# Patient Record
(demographics unavailable — no encounter records)

---

## 2024-11-05 NOTE — REVIEW OF SYSTEMS
[Negative] : Genitourinary [FreeTextEntry2] :  anxious appearing but in no acute distress [FreeTextEntry9] : Sciatica and muscle aches and pains.

## 2024-11-05 NOTE — PHYSICAL EXAM
[Normal Conjunctiva] : normal conjunctiva [Normal Venous Pressure] : normal venous pressure [Normal S1, S2] : normal S1, S2 [No Murmur] : no murmur [Clear Lung Fields] : clear lung fields [Good Air Entry] : good air entry [Soft] : abdomen soft [Normal Gait] : normal gait [No Edema] : no edema [No Rash] : no rash [Moves all extremities] : moves all extremities [Alert and Oriented] : alert and oriented [Normal memory] : normal memory [de-identified] : Thin woman in no acute distress.

## 2024-11-05 NOTE — PHYSICAL EXAM
[Normal Conjunctiva] : normal conjunctiva [Normal Venous Pressure] : normal venous pressure [Normal S1, S2] : normal S1, S2 [No Murmur] : no murmur [Clear Lung Fields] : clear lung fields [Good Air Entry] : good air entry [Soft] : abdomen soft [Normal Gait] : normal gait [No Edema] : no edema [No Rash] : no rash [Moves all extremities] : moves all extremities [Alert and Oriented] : alert and oriented [Normal memory] : normal memory [de-identified] : Thin woman in no acute distress.

## 2024-11-05 NOTE — HISTORY OF PRESENT ILLNESS
[FreeTextEntry1] : Notes fatigue in the morning. She also reports feeling depressed particularly regarding living alone and some estrangement issues regarding her daughter and granddaughter. She is taking medications consistently and produces a blood pressure log that I reviewed which shows blood pressures ranging from the 1 teens to the 170s systolic. She is scheduled to go to [place] soon and is somewhat concerned about her early morning fatigue.  She does mention that she may be depressed.   Prior: new left leg radiculr pain. Head injury from an umbrella. Possible Concussion. Taken of plavix. Still taking baby aspirin...usually. subtle itching leg rash a few weeks ago.  Prior: She has been doing okay. BP at home is better. Some " normal low" BP readings. Feel like the medicine make her tired. Was given spinal cortisone injections and her thigh pain resolved. PT ongoing.  Prior: She was returned to the office today with her son and is concerned about 2 things 1 her blood pressure seems to be elevated since our last visit.  She also has been having bilateral upper groin pain that was treated by her pain medicine doctor with a right hip bursal injection.  There was improvement in her pain. She has been taking her valsartan and labetalol consistently. She does report considerably more stress in her life of late since her 's death and some friction with her son.  Prior: Dear Jaylan, Thank you for referring her for cardiovascular evaluation and management of her hypertension. She is an 84-year-old woman who was recently diagnosed with an occipital stroke and has been noted to have labile blood pressure in your office. Last week, after noticing an unstable gait she went to the hospital and was diagnosed with an occipital stroke she was started on aspirin and Plavix and her blood pressure continued to be difficult to measure.  In your office her blood pressure ranged from 167/82 to 124/70.  Her home blood pressure readings were variable as well. She has a history of hyperlipidemia and has been taking Repatha variably. Prior to Repatha she reports severe thigh muscle aches with statin use. She also has a history of anxiety and depression and been taking sertraline and BuSpar and duloxetine, though she has been alternating her dose of sertraline and BuSpar to every other day. Previously, her blood pressure has been difficult to control because of reactions to medications.  She reports some form of reaction to lisinopril including a rash on her stomach and leg.  She took amlodipine and may have had some troubles as well. Hydralazine caused her to become very lightheaded. With history of MGUS and is followed by Dr. Khan. Prior to this, she walks for exercise but does not perform any routine aerobic activity. Blood work done 4 days ago showed an LDL of 150 mg/dL and potassium of 4.6 with a creatinine of 1.17. She recently suffered the loss of her  who  this past November after a prolonged illness.

## 2024-11-05 NOTE — DISCUSSION/SUMMARY
[FreeTextEntry1] : She is a 84-year-old with a history of labile hypertension, hypercholesterolemia and recent occipital stroke. HTN: much improved. Continue Labetalol 200 bid and valsartan 80 qd.   Stroke: continue aspirin and Repatha. Now off plavix. ECG: NSR, APCs.Non-specific ST segment changes. similar to prior. I am concerned that her symptoms are related to depression and her depression screening was positive.  I encouraged her to return to the internist who prescribed her BuSpar and to consider seeking psychiatric assistance as well. No changes to her blood pressure medications. [EKG obtained to assist in diagnosis and management of assessed problem(s)] : EKG obtained to assist in diagnosis and management of assessed problem(s)

## 2025-06-18 NOTE — ASSESSMENT
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 Presented w/ 1wk of lethargy, unsteadiness, cough, yellowish phelgm/sputum production, and SOB. Went to  on , dx'd PNA on CXR, was rx'd azithromycin, no improvement of symptoms, called PMD , and referred to ED for further management. In the ED, VSS.CBC unremarkable. Coag wnl.CMP w/ SCr 1.34 (BL around 1 in ) VBG w/ lactate wnl.UA w/ small LE, +NI, 6 wbc, many bacteria, 3 epith cells.covid/rsv/flu neg. CXR with LLL opacity. CT chest noncon w/ b/l lower lobe GGO, mildly increased since , infectious vs. inflammatory. 5mm new ANNA nodule, f/u 6-12mo. Treated with Azithromycin in the, continued on Ceftriaxone on admission. Blood cultures no growth to date. Urine culture grew E. coli. Discharged with cefpodoxime to be completed , evaluated and recommended home PT and home care.  Upon presentation, on video, patent reports fatigue, sob on exertion, denies cough, wheezing, fever, chills, chest tightness. Reports she is feeling overwhelmed as she lives alone and received HHA for a few hours in the evening only. With no known history of childhood asthma, eczema, frequent URI, history of PNA, or intubation. Has never smoked, reports secondhand smoke exposure due to  's smoking history. Seen and followed by Dr. Winchester, geriatric clinic.   -All medications reviewed and reconciled with patient, use as prescribed. -F/U with Pulmonologist in 1-2 weeks, PCP , Home care Nurse , pending PT/OT and HHA services -Advised to call office immediately with any change or worsening of symptoms -Advised to call 911 of go to ER immediately with any S/S of acute respiratory distress or worsening of symptoms -Reviewed F/U CT scan in 6-8 weeks, resolution of PNA

## 2025-06-18 NOTE — HISTORY OF PRESENT ILLNESS
[Home] : at home, [unfilled] , at the time of the visit. [Medical Office: ([facility])___] : at the medical office located in  [Telehealth (audio & video)] : This visit was provided via telehealth using real-time 2-way audio visual technology. [Verbal consent obtained from patient] : the patient, [unfilled] [Discharged with Nursing Homecare] : Discharged with nursing homecare [[place]] : Post-hospitalization from [facility] [Pneumonia] : Pneumonia [Respiratory failure with hypoxia] : Respiratory failure with hypoxia [Other: _____] : [unfilled] [Yes] : Yes [Admitted on: ___] : The patient was admitted on [unfilled] [Discharged on ___] : discharged on [unfilled] [Discharge Summary] : discharge summary [Pertinent Labs] : pertinent labs [Radiology Findings] : radiology findings [Discharge Med List] : discharge medication list [Med Reconciliation] : medication reconciliation has been completed [Patient Contacted By: ____] : and contacted by [unfilled] [FreeTextEntry2] : Patient is an 84y/o F with PMHx HTN/HLD, MGUS, occipital stroke with no residual deficit, "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 Presented w/ 1wk of lethargy, unsteadiness, cough, yellowish phelgm/sputum production, and SOB. Went to UC on , dx'd PNA on CXR, was rx'd azithromycin, no improvement of symptoms, called PMD , and referred to ED for further management. In the ED, VSS.CBC unremarkable. Coag wnl.CMP w/ SCr 1.34 (BL around 1 in ) VBG w/ lactate wnl.UA w/ small LE, +NI, 6 wbc, many bacteria, 3 epith cells.covid/rsv/flu neg. CXR with LLL opacity. CT chest noncon w/ b/l lower lobe GGO, mildly increased since , infectious vs. inflammatory. 5mm new ANNA nodule, f/u 6-12mo. Treated with Azithromycin in the, continued on Ceftriaxone on admission. Blood cultures no growth to date. Urine culture grew E. coli. Discharged with cefpodoxime to be completed , evaluated and recommended home PT and home care.  Upon presentation, on video, patent reports fatigue, sob on exertion, denies cough, wheezing, fever, chills, chest tightness. Reports she is feeling overwhelmed as she lives alone and received HHA for a few hours in the evening only. With no known history of childhood asthma, eczema, frequent URI, history of PNA, or intubation. Has never smoked, reports secondhand smoke exposure due to  's smoking history. Seen and followed by Dr. Winchester, geriatric clinic.

## 2025-06-18 NOTE — REVIEW OF SYSTEMS
[Fatigue] : fatigue [Dyspnea on Exertion] : dyspnea on exertion [Anxiety] : anxiety [Negative] : Heme/Lymph

## 2025-06-18 NOTE — PHYSICAL EXAM
[Normal] : no acute distress, well nourished, well developed and well-appearing [No Respiratory Distress] : no respiratory distress  [No Accessory Muscle Use] : no accessory muscle use [No Focal Deficits] : no focal deficits [Normal Affect] : the affect was normal [Normal Insight/Judgement] : insight and judgment were intact [47635 - High Complexity requires an extensive number of possible diagnoses and/or the management options, extensive complexity of the medical data (tests, etc.) to be reviewed, and a high risk of significant complications, morbidity, and/or mortality as w] : High Complexity

## 2025-06-20 NOTE — HISTORY OF PRESENT ILLNESS
[No falls in past year] : Patient reported no falls in the past year [Completely Independent] : Completely independent. [Patient is independent with] : bathing [Independent] : managing finances [] : Assistance needed doing laundry [Walker] : walker [1] : 2) Feeling down, depressed, or hopeless for several days (1) [PHQ-2 Positive] : PHQ-2 Positive [I have developed a follow-up plan documented below in the note.] : I have developed a follow-up plan documented below in the note. [With Patient/Caregiver] : , with patient/caregiver [Reviewed no changes] : Reviewed, no changes [I will adhere to the patient's wishes.] : I will adhere to the patient's wishes. [FreeTextEntry1] : JMAEE ESCAMILLA is an 86 yo woman with PMH of HTN, h/o occipital CVA, HLD, MGUS, gait instability, spinal stenosis and depression who presents for initial evaluation at geriatrics practice.  Prior PCP: Dr. Emery (retired) Cards: Dr. Jay Lisker Psychiatrist: in [place]  Heme: Dr. Ilan Khan  Optho: Dr. Espinal  Ortho/pain management: Dr. Leon Noble   Neuro:?  Patient looking to establish primary care. She reports spending half the year in [place]  Hospital course ([facility] -): -Presenting w/ 1 wk of lethargy, unsteadiness on her feet (but no fall), cough with some yellow phelgm/sputum production, and SOB. Went to UC on , dx'd PNA on CXR, was rx'd azithromycin, but not improving, called PMD , and referred to ED for further mgnt. -found to have UTI and pna-Received Azithromycin in the, continued on Ceftriaxone on admission. -Blood cultures no growth to date. Urine culture grew E.coli,  -discharged with 2 days of cefpodoxime  -Chest CT with w/ b/l lower lobe GGO, mildly increased since 2016, infectious vs. inflammatory. 5mm new ANNA nodule- advised repeat imaging in 6-12 months and to see pulm on d/c  -Patient found to have TRACY, Cr on d/c was 1.36 (baseline 1-1.1), no other notable labs  Patient states that she is feeling ok since being discharged home yesterday. Does note feeling a bit unsteady on her feet and worrying about being home alone. She has a HHA (private) coming to her home this afternoon to trial and is planning to start using her regularly.   H/o occipital stroke:  -noted on imaging in  -was evaluated in the hospital for increased gait instability and imaging revealed occipital stroke  -she was started on aspirin and plavix at that time  -should still be on asa 81mg but has not been taking this  HLD: -ordered for Repatha [id number] but has not been taking this for the last 2 months due to concerns about administering the injection herself -last FLP 2024 with   HTN: -on labetolol 200mg bid and valsartan 80 mg qd  -states that BP has been more stable   Gait instability: -notes feeling unsure of herself when walking/scared to fall especially when using the stairs in her home  -walks with a walker  -last fall was 1 year ago  -works with PT   Spinal stenosis: -long standing history  -follows with ortho/pain management, Dr. Noble  -has received steroid injections in the past which help  -using tylenol 1000mg prn ; occasionally takes meloxicam but states that she uses this sparingly  -works with PT  MGUS: -follows with heme  -was told her MGUS "disappeared"  Depression/anxiety: -was following with psychiatrist in [place]  -currently on sertraline 50mg bid and buspirone 5mg bid  -rae  in   -also estranged from her DIL and granddaughter -reports feeling significant anxiety on a daily basis and feels quite lonely   Vision problems: -follows with ophtho, Dr. 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 - passed away in  [TextBox_25] : due  [TextBox_37] : follows with ophtho [TextBox_19] : due  [FreeTextEntry2] : due  [FreeTextEntry7] : will discuss preventative screening at length during f/u visit [de-identified] : 6 [de-identified] : looking for assistance from [facility] [de-identified] : looking for assistance from [facility] [de-identified] : looking for assistance from [employer] [de-identified] : looking for assistance from [facility] [de-identified] : 4 [[id number]] : 2 [AdvancecareDate] : 06/25 [FreeTextEntry4] : -no HCP or MOLST on file  -states she would likely pick her so, Mohan Alaniz () to be HCP but would like to discuss with him prior to documenting -opened GOC discussion today as there is documentation of DNR/DNI status in note during recent hospitalization- patient states that these wishes were never formally documented on MOLST and she is unsure that she would want to be made DNR/DNI. For now she remains, FULL CODE. Will think it over further and plan to discuss at next visit.

## 2025-06-20 NOTE — DATA REVIEWED
[FreeTextEntry1] : ACC: [id number]     EXAM:  CT CHEST   ORDERED BY:  GRIFFIN CARPENTER  PROCEDURE DATE:  06/14/2025    INTERPRETATION:  CLINICAL INFORMATION: Crackles in the left lower lobe. Lethargy.  COMPARISON: CT chest 5/7/2016  CONTRAST/COMPLICATIONS: IV Contrast: NONE Oral Contrast: NONE.  PROCEDURE: CT scan of the chest was obtained without intravenous contrast.  FINDINGS:  AIRWAYS/LUNGS/PLEURA: Patent central airways. Bronchiectasis. Lower lobe predominant mosaic attenuation and groundglass opacities, increased from 2016. 0.6 cm nodule left lower lobe nodule (301, 94), unchanged from 2016. 5 mm left upper lobe nodule is new, possible intrapulmonary lymph node. No pleural effusion.  MEDIASTINUM AND SKIP: No lymphadenopathy.  VESSELS: Coronary artery and aortic calcifications.  HEART: Heart size is normal. Mitral annular calcification. No pericardial effusion.  VISUALIZED UPPER ABDOMEN: Small hiatal hernia left upper pole hyperdense subcentimeter lesion, stable since 2016.  CHEST WALL AND BONES: Degenerative changes thoracic spine.  IMPRESSION:  Bilateral lower lobe groundglass opacities, mildly increased since 2016. Differential includes infectious and inflammatory process.  5 mm new left upper lobe lung nodule, probably benign, consider 6-12 months follow-up.  --- End of Report ---     CY MEZA MD; Resident Radiologist This document has been electronically signed. MIKA DALY MD; Attending Radiologist This document has been electronically signed. Jun 14 2025  8:22PM  ACC: [id number]     EXAM:  XR CHEST PA LAT 2V   ORDERED BY:  GRIFFIN CARPENTER  PROCEDURE DATE:  06/14/2025    INTERPRETATION:  EXAMINATION: XR CHEST PA AND LATERAL  CLINICAL INDICATION: crackles LLL, SOB  TECHNIQUE: 2 views; Frontal and lateral views of the chest were obtained.  COMPARISON: Chest x-ray 6/11/2025.  FINDINGS:  The heart is similar in size. Hazy left lower lung field opacity may represent atelectasis and/or developing pneumonia. There is no pneumothorax or pleural effusion. No acute osseous abnormalities. Please refer to the subsequent chest CT for further details. IMPRESSION: Hazy left lower lung field opacity may represent atelectasis and/or developing pneumonia.  --- End of Report ---     JEAN CARRANZA MD; Resident Radiologist This document has been electronically signed. ABIOLA MUELLER MD; Attending Radiologist This document has been electronically signed. Jun 14 2025 10:51PM

## 2025-06-20 NOTE — HISTORY OF PRESENT ILLNESS
[No falls in past year] : Patient reported no falls in the past year [Completely Independent] : Completely independent. [Patient is independent with] : bathing [Independent] : managing finances [] : Assistance needed doing laundry [Walker] : walker [1] : 2) Feeling down, depressed, or hopeless for several days (1) [PHQ-2 Positive] : PHQ-2 Positive [I have developed a follow-up plan documented below in the note.] : I have developed a follow-up plan documented below in the note. [With Patient/Caregiver] : , with patient/caregiver [Reviewed no changes] : Reviewed, no changes [I will adhere to the patient's wishes.] : I will adhere to the patient's wishes. [FreeTextEntry1] : JAMEE ESCAMILLA is an 86 yo woman with PMH of HTN, h/o occipital CVA, HLD, MGUS, gait instability, spinal stenosis and depression who presents for initial evaluation at geriatrics practice.  Prior PCP: Dr. Emery (retired) Cards: Dr. Jay Lisker Psychiatrist: in [place]  Heme: Dr. Ilan Khan  Optho: Dr. Espinal  Ortho/pain management: Dr. Leon Noble   Neuro:?  Patient looking to establish primary care. She reports spending half the year in [place]  Hospital course ([facility] -): -Presenting w/ 1 wk of lethargy, unsteadiness on her feet (but no fall), cough with some yellow phelgm/sputum production, and SOB. Went to UC on , dx'd PNA on CXR, was rx'd azithromycin, but not improving, called PMD , and referred to ED for further mgnt. -found to have UTI and pna-Received Azithromycin in the, continued on Ceftriaxone on admission. -Blood cultures no growth to date. Urine culture grew E.coli,  -discharged with 2 days of cefpodoxime  -Chest CT with w/ b/l lower lobe GGO, mildly increased since 2016, infectious vs. inflammatory. 5mm new ANNA nodule- advised repeat imaging in 6-12 months and to see pulm on d/c  -Patient found to have TRACY, Cr on d/c was 1.36 (baseline 1-1.1), no other notable labs  Patient states that she is feeling ok since being discharged home yesterday. Does note feeling a bit unsteady on her feet and worrying about being home alone. She has a HHA (private) coming to her home this afternoon to trial and is planning to start using her regularly.   H/o occipital stroke:  -noted on imaging in  -was evaluated in the hospital for increased gait instability and imaging revealed occipital stroke  -she was started on aspirin and plavix at that time  -should still be on asa 81mg but has not been taking this  HLD: -ordered for Repatha [id number] but has not been taking this for the last 2 months due to concerns about administering the injection herself -last FLP 2024 with   HTN: -on labetolol 200mg bid and valsartan 80 mg qd  -states that BP has been more stable   Gait instability: -notes feeling unsure of herself when walking/scared to fall especially when using the stairs in her home  -walks with a walker  -last fall was 1 year ago  -works with PT   Spinal stenosis: -long standing history  -follows with ortho/pain management, Dr. Noble  -has received steroid injections in the past which help  -using tylenol 1000mg prn ; occasionally takes meloxicam but states that she uses this sparingly  -works with PT  MGUS: -follows with heme  -was told her MGUS "disappeared"  Depression/anxiety: -was following with psychiatrist in [place]  -currently on sertraline 50mg bid and buspirone 5mg bid  -rae  in   -also estranged from her DIL and granddaughter -reports feeling significant anxiety on a daily basis and feels quite lonely   Vision problems: -follows with ophtho, Dr. 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 - passed away in  [TextBox_25] : due  [TextBox_37] : follows with ophtho [TextBox_19] : due  [FreeTextEntry2] : due  [FreeTextEntry7] : will discuss preventative screening at length during f/u visit [de-identified] : 6 [de-identified] : looking for assistance from [facility] [de-identified] : looking for assistance from [employer] [de-identified] : looking for assistance from [employer] [de-identified] : looking for assistance from [facility] [de-identified] : 4 [[id number]] : 2 [AdvancecareDate] : 06/25 [FreeTextEntry4] : -no HCP or MOLST on file  -states she would likely pick her so, Mohan Alaniz () to be HCP but would like to discuss with him prior to documenting -opened GOC discussion today as there is documentation of DNR/DNI status in note during recent hospitalization- patient states that these wishes were never formally documented on MOLST and she is unsure that she would want to be made DNR/DNI. For now she remains, FULL CODE. Will think it over further and plan to discuss at next visit.

## 2025-06-20 NOTE — REVIEW OF SYSTEMS
[Eyesight Problems] : eyesight problems [As Noted in HPI] : as noted in HPI [Anxiety] : anxiety [Depression] : depression [Negative] : Heme/Lymph [Fever] : no fever [Chills] : no chills [Feeling Poorly] : not feeling poorly [Discharge From Eyes] : no purulent discharge from the eyes [Dry Eyes] : no dryness of the eyes [Nasal Discharge] : no nasal discharge [Sore Throat] : no sore throat [Chest Pain] : no chest pain [Palpitations] : no palpitations [Shortness Of Breath] : no shortness of breath [Wheezing] : no wheezing [Cough] : no cough [SOB on Exertion] : no shortness of breath during exertion [Abdominal Pain] : no abdominal pain [Vomiting] : no vomiting [Constipation] : no constipation [Diarrhea] : no diarrhea [Dysuria] : no dysuria [Skin Wound] : no skin wound [Confused] : no confusion [Dizziness] : no dizziness

## 2025-06-20 NOTE — PHYSICAL EXAM
[Alert] : alert [No Acute Distress] : in no acute distress [Sclera] : the sclera and conjunctiva were normal [EOMI] : extraocular movements were intact [Normal Oral Mucosa] : normal oral mucosa [Normal Outer Ear/Nose] : the ears and nose were normal in appearance [Both Tympanic Membranes Were Examined] : both tympanic membranes were normal [Normal Appearance] : the appearance of the neck was normal [Supple] : the neck was supple [No Respiratory Distress] : no respiratory distress [No Acc Muscle Use] : no accessory muscle use [Respiration, Rhythm And Depth] : normal respiratory rhythm and effort [Auscultation Breath Sounds / Voice Sounds] : lungs were clear to auscultation bilaterally [Normal S1, S2] : normal S1 and S2 [Heart Rate And Rhythm] : heart rate was normal and rhythm regular [Edema] : edema was not present [Pedal Pulses Normal] : the pedal pulses are present [Bowel Sounds] : normal bowel sounds [Abdomen Tenderness] : non-tender [Abdomen Soft] : soft [Cervical Lymph Nodes Enlarged Posterior Bilaterally] : posterior cervical [Cervical Lymph Nodes Enlarged Anterior Bilaterally] : anterior cervical, supraclavicular [No CVA Tenderness] : no CVA  tenderness [Normal Color / Pigmentation] : normal skin color and pigmentation [No Focal Deficits] : no focal deficits [Oriented To Time, Place, And Person] : oriented to person, place, and time [Normal Affect] : the affect was normal [Normal Insight/Judgment] : insight and judgment were intact [Normal Gait] : abnormal gait

## 2025-06-20 NOTE — DATA REVIEWED
[FreeTextEntry1] : ACC: [id number]     EXAM:  CT CHEST   ORDERED BY:  GRIFFIN CARPENTER  PROCEDURE DATE:  06/14/2025    INTERPRETATION:  CLINICAL INFORMATION: Crackles in the left lower lobe. Lethargy.  COMPARISON: CT chest 5/7/2016  CONTRAST/COMPLICATIONS: IV Contrast: NONE Oral Contrast: NONE.  PROCEDURE: CT scan of the chest was obtained without intravenous contrast.  FINDINGS:  AIRWAYS/LUNGS/PLEURA: Patent central airways. Bronchiectasis. Lower lobe predominant mosaic attenuation and groundglass opacities, increased from 2016. 0.6 cm nodule left lower lobe nodule (301, 94), unchanged from 2016. 5 mm left upper lobe nodule is new, possible intrapulmonary lymph node. No pleural effusion.  MEDIASTINUM AND SKIP: No lymphadenopathy.  VESSELS: Coronary artery and aortic calcifications.  HEART: Heart size is normal. Mitral annular calcification. No pericardial effusion.  VISUALIZED UPPER ABDOMEN: Small hiatal hernia left upper pole hyperdense subcentimeter lesion, stable since 2016.  CHEST WALL AND BONES: Degenerative changes thoracic spine.  IMPRESSION:  Bilateral lower lobe groundglass opacities, mildly increased since 2016. Differential includes infectious and inflammatory process.  5 mm new left upper lobe lung nodule, probably benign, consider 6-12 months follow-up.  --- End of Report ---     CY MEZA MD; Resident Radiologist This document has been electronically signed. MIKA DALY MD; Attending Radiologist This document has been electronically signed. Jun 14 2025  8:22PM  ACC: [id number]     EXAM:  XR CHEST PA LAT 2V   ORDERED BY:  GRIFFIN CARPENTER  PROCEDURE DATE:  06/14/2025    INTERPRETATION:  EXAMINATION: XR CHEST PA AND LATERAL  CLINICAL INDICATION: crackles LLL, SOB  TECHNIQUE: 2 views; Frontal and lateral views of the chest were obtained.  COMPARISON: Chest x-ray 6/11/2025.  FINDINGS:  The heart is similar in size. Hazy left lower lung field opacity may represent atelectasis and/or developing pneumonia. There is no pneumothorax or pleural effusion. No acute osseous abnormalities. Please refer to the subsequent chest CT for further details. IMPRESSION: Hazy left lower lung field opacity may represent atelectasis and/or developing pneumonia.  --- End of Report ---     JEAN CARRANZA MD; Resident Radiologist This document has been electronically signed. ABIOLA MUELLER MD; Attending Radiologist This document has been electronically signed. Jun 14 2025 10:51PM

## 2025-06-24 NOTE — PHYSICAL EXAM
[No Acute Distress] : no acute distress [Normal Oropharynx] : normal oropharynx [Normal Appearance] : normal appearance [No Neck Mass] : no neck mass [Normal Rate/Rhythm] : normal rate/rhythm [Normal S1, S2] : normal s1, s2 [No Murmurs] : no murmurs [No Resp Distress] : no resp distress [Clear to Auscultation Bilaterally] : clear to auscultation bilaterally [No Abnormalities] : no abnormalities [Benign] : benign [No Clubbing] : no clubbing [No Cyanosis] : no cyanosis [No Edema] : no edema [FROM] : FROM [Normal Color/ Pigmentation] : normal color/ pigmentation [No Focal Deficits] : no focal deficits [Oriented x3] : oriented x3 [Normal Affect] : normal affect [TextBox_99] : cane

## 2025-06-24 NOTE — HISTORY OF PRESENT ILLNESS
[Former] : former [TextBox_4] : f/u visit post admission to [facility] earlier this month, dx with pna and e coli UTI pt had presented to UC, for fatigue. crackles heard on exam, dx with pna and given azithro but felt weaker. PCP had retired. went to ED. No resp sx CT with b/l LL GG opacities - slightly increased c/w 2016, c/w infect/inflamm. and 5mm ANNA nodule also, E coli UTI. had recently received botox for urinary incont tx with rocephin, azithro, d/c with cefpodax  Pt at her baseline again, which includes fatigue She is overall very sedentary due to back pain, spinal stenosis, gait instability She has chronic sob, but attributes to severe deconditioning , and the pain. no recent changes  former smoker, mostly social. heavy second hand smoke   [YearQuit] : >20

## 2025-06-24 NOTE — ASSESSMENT
[FreeTextEntry1] : s/p admission , tx for CAP and E coli UTI repeat CT chest ordered for August --  f/u based on results

## 2025-06-29 NOTE — HISTORY OF PRESENT ILLNESS
[No falls in past year] : Patient reported no falls in the past year [Completely Independent] : Completely independent. [Patient is independent with] : bathing [Independent] : managing finances [] : Assistance needed doing laundry [Walker] : walker [1] : 2) Feeling down, depressed, or hopeless for several days (1) [PHQ-2 Positive] : PHQ-2 Positive [I have developed a follow-up plan documented below in the note.] : I have developed a follow-up plan documented below in the note. [With Patient/Caregiver] : , with patient/caregiver [Reviewed no changes] : Reviewed, no changes [I will adhere to the patient's wishes.] : I will adhere to the patient's wishes. [FreeTextEntry1] :   JAMEE ESCAMILLA is an 84 yo woman with PMH of HTN, h/o occipital CVA, HLD, MGUS, gait instability, spinal stenosis and depression who presents for follow up at geriatrics practice on from last visit on . Pt was discharged from [facility] on  and had been in contact with this office.  Pt seen on . Since last visit with Sherita HERNANDEZ NP pt still fatiuged and has back pain. Pt was treated for pneumonia and UTI during her hospitalization.  Prior PCP: Dr. Emery (retired) Cards: Dr. Jay Lisker Psychiatrist: in [place]  Heme: Dr. Ilan Khan  Optho: Dr. Espinal  Ortho/pain management: Dr. Leon Noble   Neuro:?  She reports spending half the year in [place]  Hospital course ([facility] -): -Presenting w/ 1 wk of lethargy, unsteadiness on her feet (but no fall), cough with some yellow phelgm/sputum production, and SOB. Went to UC on , dx'd PNA on CXR, was rx'd azithromycin, but not improving, called PMD , and referred to ED for further mgnt. -found to have UTI and pna-Received Azithromycin in the, continued on Ceftriaxone on admission. -Blood cultures no growth to date. Urine culture grew E.coli,  -discharged with 2 days of cefpodoxime  -Chest CT with w/ b/l lower lobe GGO, mildly increased since 2016, infectious vs. inflammatory. 5mm new ANNA nodule- advised repeat imaging in 6-12 months and to see pulm on d/c  -Patient found to have TRACY, Cr on d/c was 1.36 (baseline 1-1.1), no other notable labs  Patient states that she is feeling much better since last visit on . Does note feeling a bit unsteady on her feet and worrying about being home alone. She has a HHA (private)..   H/o occipital stroke: stable, unsteady gait.  -should still be on asa 81mg but has not been taking this  HLD: -ordered for Repatha [id number] but has not been taking this for the last 2 months due to concerns about administering the injection herself -last FLP 2024 with   HTN: -on labetolol 200mg bid and valsartan 80 mg qd  -states that BP has been more stable   Gait instability: -notes feeling unsure of herself when walking/scared to fall especially when using the stairs in her home  -walks with a walker  -last fall was 1 year ago  -works with PT   Spinal stenosis: -long standing history  -follows with ortho/pain management, Dr. Noble  -has received steroid injections in the past which help  -using tylenol 1000mg prn ; occasionally takes meloxicam but states that she uses this sparingly  -works with PT  MGUS: -follows with heme  -was told her MGUS "disappeared"  Depression/anxiety: -was following with psychiatrist in [place]  -currently on sertraline 50mg bid and buspirone 5mg bid  -rae  in   -also estranged from her DIL and granddaughter -reports feeling significant anxiety on a daily basis and feels quite lonely   Vision problems: -follows with ophtho, Dr. 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 Long history of sleeping a lot. Always... lifelong. But fatigue is new over a few weeks (not months or years). Feeling tired and fatigued led her to go to urgent care. Fatigue not improved since hospitalization and discharge.   Has an aide 9-1pm 5 days a week since hospital. Pt feeling overwhelmed with help and care needs.    [TextBox_25] : due  [TextBox_37] : follows with ophtho [TextBox_19] : due  [FreeTextEntry2] : due  [FreeTextEntry7] : will discuss preventative screening at length during f/u visit [de-identified] : 6 [de-identified] : looking for assistance from [facility] [de-identified] : looking for assistance from [facility] [de-identified] : looking for assistance from [employer] [de-identified] : looking for assistance from [facility] [de-identified] : 4 [NO] : No [AMC0Ivala] : 2 [AdvancecareDate] : 06/25 [FreeTextEntry4] : -no HCP or MOLST on file  -states she would likely pick her so, Mohan Alaniz () to be HCP but would like to discuss with him prior to documenting -opened GOC discussion today as there is documentation of DNR/DNI status in note during recent hospitalization- patient states that these wishes were never formally documented on MOLST and she is unsure that she would want to be made DNR/DNI. For now she remains, FULL CODE. Will think it over further and plan to discuss at next visit.

## 2025-06-29 NOTE — HISTORY OF PRESENT ILLNESS
[No falls in past year] : Patient reported no falls in the past year [Completely Independent] : Completely independent. [Patient is independent with] : bathing [Independent] : managing finances [] : Assistance needed doing laundry [Walker] : walker [1] : 2) Feeling down, depressed, or hopeless for several days (1) [PHQ-2 Positive] : PHQ-2 Positive [I have developed a follow-up plan documented below in the note.] : I have developed a follow-up plan documented below in the note. [With Patient/Caregiver] : , with patient/caregiver [Reviewed no changes] : Reviewed, no changes [I will adhere to the patient's wishes.] : I will adhere to the patient's wishes. [FreeTextEntry1] :   JAMEE ESCAMILLA is an 84 yo woman with PMH of HTN, h/o occipital CVA, HLD, MGUS, gait instability, spinal stenosis and depression who presents for follow up at geriatrics practice on from last visit on . Pt was discharged from [facility] on  and had been in contact with this office.  Pt seen on . Since last visit with Sherita HERNANDEZ NP pt still fatiuged and has back pain. Pt was treated for pneumonia and UTI during her hospitalization.  Prior PCP: Dr. Emery (retired) Cards: Dr. Jay Lisker Psychiatrist: in [place]  Heme: Dr. Ilan Khan  Optho: Dr. Espinal  Ortho/pain management: Dr. Leon Noble   Neuro:?  She reports spending half the year in [place]  Hospital course ([facility] -): -Presenting w/ 1 wk of lethargy, unsteadiness on her feet (but no fall), cough with some yellow phelgm/sputum production, and SOB. Went to UC on , dx'd PNA on CXR, was rx'd azithromycin, but not improving, called PMD , and referred to ED for further mgnt. -found to have UTI and pna-Received Azithromycin in the, continued on Ceftriaxone on admission. -Blood cultures no growth to date. Urine culture grew E.coli,  -discharged with 2 days of cefpodoxime  -Chest CT with w/ b/l lower lobe GGO, mildly increased since 2016, infectious vs. inflammatory. 5mm new ANNA nodule- advised repeat imaging in 6-12 months and to see pulm on d/c  -Patient found to have TRACY, Cr on d/c was 1.36 (baseline 1-1.1), no other notable labs  Patient states that she is feeling much better since last visit on . Does note feeling a bit unsteady on her feet and worrying about being home alone. She has a HHA (private)..   H/o occipital stroke: stable, unsteady gait.  -should still be on asa 81mg but has not been taking this  HLD: -ordered for Repatha [id number] but has not been taking this for the last 2 months due to concerns about administering the injection herself -last FLP 2024 with   HTN: -on labetolol 200mg bid and valsartan 80 mg qd  -states that BP has been more stable   Gait instability: -notes feeling unsure of herself when walking/scared to fall especially when using the stairs in her home  -walks with a walker  -last fall was 1 year ago  -works with PT   Spinal stenosis: -long standing history  -follows with ortho/pain management, Dr. Noble  -has received steroid injections in the past which help  -using tylenol 1000mg prn ; occasionally takes meloxicam but states that she uses this sparingly  -works with PT  MGUS: -follows with heme  -was told her MGUS "disappeared"  Depression/anxiety: -was following with psychiatrist in [place]  -currently on sertraline 50mg bid and buspirone 5mg bid  -rae  in   -also estranged from her DIL and granddaughter -reports feeling significant anxiety on a daily basis and feels quite lonely   Vision problems: -follows with ophtho, Dr. 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 Long history of sleeping a lot. Always... lifelong. But fatigue is new over a few weeks (not months or years). Feeling tired and fatigued led her to go to urgent care. Fatigue not improved since hospitalization and discharge.   Has an aide 9-1pm 5 days a week since hospital. Pt feeling overwhelmed with help and care needs.    [TextBox_25] : due  [TextBox_37] : follows with ophtho [TextBox_19] : due  [FreeTextEntry2] : due  [FreeTextEntry7] : will discuss preventative screening at length during f/u visit [de-identified] : 6 [de-identified] : looking for assistance from [facility] [de-identified] : looking for assistance from [facility] [de-identified] : looking for assistance from [employer] [de-identified] : looking for assistance from [facility] [de-identified] : 4 [NO] : No [[id number]] : 2 [AdvancecareDate] : 06/25 [FreeTextEntry4] : -no HCP or MOLST on file  -states she would likely pick her so, Mohan Alaniz () to be HCP but would like to discuss with him prior to documenting -opened GOC discussion today as there is documentation of DNR/DNI status in note during recent hospitalization- patient states that these wishes were never formally documented on MOLST and she is unsure that she would want to be made DNR/DNI. For now she remains, FULL CODE. Will think it over further and plan to discuss at next visit.

## 2025-06-29 NOTE — DATA REVIEWED
[FreeTextEntry1] : ACC: [id number]     EXAM:  CT CHEST   ORDERED BY:  GRIFFIN CARPENTER  PROCEDURE DATE:  06/14/2025    INTERPRETATION:  CLINICAL INFORMATION: Crackles in the left lower lobe. Lethargy.  COMPARISON: CT chest 5/7/2016  CONTRAST/COMPLICATIONS: IV Contrast: NONE Oral Contrast: NONE.  PROCEDURE: CT scan of the chest was obtained without intravenous contrast.  FINDINGS:  AIRWAYS/LUNGS/PLEURA: Patent central airways. Bronchiectasis. Lower lobe predominant mosaic attenuation and groundglass opacities, increased from 2016. 0.6 cm nodule left lower lobe nodule (301, 94), unchanged from 2016. 5 mm left upper lobe nodule is new, possible intrapulmonary lymph node. No pleural effusion.  MEDIASTINUM AND SKIP: No lymphadenopathy.  VESSELS: Coronary artery and aortic calcifications.  HEART: Heart size is normal. Mitral annular calcification. No pericardial effusion.  VISUALIZED UPPER ABDOMEN: Small hiatal hernia left upper pole hyperdense subcentimeter lesion, stable since 2016.  CHEST WALL AND BONES: Degenerative changes thoracic spine.  IMPRESSION:  Bilateral lower lobe groundglass opacities, mildly increased since 2016. Differential includes infectious and inflammatory process.  5 mm new left upper lobe lung nodule, probably benign, consider 6-12 months follow-up.  --- End of Report ---     CY MEZA MD; Resident Radiologist This document has been electronically signed. MIKA DALY MD; Attending Radiologist This document has been electronically signed. Jun 14 2025  8:22PM  ACC: [id number]     EXAM:  XR CHEST PA LAT 2V   ORDERED BY:  GRIFFIN CARPENTER  PROCEDURE DATE:  06/14/2025    INTERPRETATION:  EXAMINATION: XR CHEST PA AND LATERAL  CLINICAL INDICATION: crackles LLL, SOB  TECHNIQUE: 2 views; Frontal and lateral views of the chest were obtained.  COMPARISON: Chest x-ray 6/11/2025.  FINDINGS:  The heart is similar in size. Hazy left lower lung field opacity may represent atelectasis and/or developing pneumonia. There is no pneumothorax or pleural effusion. No acute osseous abnormalities. Please refer to the subsequent chest CT for further details. IMPRESSION: Hazy left lower lung field opacity may represent atelectasis and/or developing pneumonia.  --- End of Report ---     JEAN CARRANZA MD; Resident Radiologist This document has been electronically signed. ABIOLA MUELLER MD; Attending Radiologist This document has been electronically signed. Jun 14 2025 10:51PM

## 2025-06-29 NOTE — ASSESSMENT
[FreeTextEntry1] : fatigue labetolol HR is low. - will ask dr j lisker about decreasing the dose. Currently taking labetolol 200 mg twice a day

## 2025-06-29 NOTE — DATA REVIEWED
[FreeTextEntry1] : ACC: [id number]     EXAM:  CT CHEST   ORDERED BY:  GRIFFIN CARPENTER  PROCEDURE DATE:  06/14/2025    INTERPRETATION:  CLINICAL INFORMATION: Crackles in the left lower lobe. Lethargy.  COMPARISON: CT chest 5/7/2016  CONTRAST/COMPLICATIONS: IV Contrast: NONE Oral Contrast: NONE.  PROCEDURE: CT scan of the chest was obtained without intravenous contrast.  FINDINGS:  AIRWAYS/LUNGS/PLEURA: Patent central airways. Bronchiectasis. Lower lobe predominant mosaic attenuation and groundglass opacities, increased from 2016. 0.6 cm nodule left lower lobe nodule (301, 94), unchanged from 2016. 5 mm left upper lobe nodule is new, possible intrapulmonary lymph node. No pleural effusion.  MEDIASTINUM AND SKIP: No lymphadenopathy.  VESSELS: Coronary artery and aortic calcifications.  HEART: Heart size is normal. Mitral annular calcification. No pericardial effusion.  VISUALIZED UPPER ABDOMEN: Small hiatal hernia left upper pole hyperdense subcentimeter lesion, stable since 2016.  CHEST WALL AND BONES: Degenerative changes thoracic spine.  IMPRESSION:  Bilateral lower lobe groundglass opacities, mildly increased since 2016. Differential includes infectious and inflammatory process.  5 mm new left upper lobe lung nodule, probably benign, consider 6-12 months follow-up.  --- End of Report ---     CY MEZA MD; Resident Radiologist This document has been electronically signed. MIKA DALY MD; Attending Radiologist This document has been electronically signed. Jun 14 2025  8:22PM  ACC: [id number]     EXAM:  XR CHEST PA LAT 2V   ORDERED BY:  GRIFFIN CARPENTER  PROCEDURE DATE:  06/14/2025    INTERPRETATION:  EXAMINATION: XR CHEST PA AND LATERAL  CLINICAL INDICATION: crackles LLL, SOB  TECHNIQUE: 2 views; Frontal and lateral views of the chest were obtained.  COMPARISON: Chest x-ray 6/11/2025.  FINDINGS:  The heart is similar in size. Hazy left lower lung field opacity may represent atelectasis and/or developing pneumonia. There is no pneumothorax or pleural effusion. No acute osseous abnormalities. Please refer to the subsequent chest CT for further details. IMPRESSION: Hazy left lower lung field opacity may represent atelectasis and/or developing pneumonia.  --- End of Report ---     JEAN CARRANZA MD; Resident Radiologist This document has been electronically signed. ABIOLA MUELLER MD; Attending Radiologist This document has been electronically signed. Jun 14 2025 10:51PM

## 2025-06-29 NOTE — PHYSICAL EXAM
[Alert] : alert [Sclera] : the sclera and conjunctiva were normal [EOMI] : extraocular movements were intact [Normal Oral Mucosa] : normal oral mucosa [Normal Outer Ear/Nose] : the ears and nose were normal in appearance [Both Tympanic Membranes Were Examined] : both tympanic membranes were normal [Normal Appearance] : the appearance of the neck was normal [Supple] : the neck was supple [No Respiratory Distress] : no respiratory distress [No Acc Muscle Use] : no accessory muscle use [Respiration, Rhythm And Depth] : normal respiratory rhythm and effort [Auscultation Breath Sounds / Voice Sounds] : lungs were clear to auscultation bilaterally [Normal S1, S2] : normal S1 and S2 [Heart Rate And Rhythm] : heart rate was normal and rhythm regular [Edema] : edema was not present [Pedal Pulses Normal] : the pedal pulses are present [Bowel Sounds] : normal bowel sounds [Abdomen Tenderness] : non-tender [Abdomen Soft] : soft [Cervical Lymph Nodes Enlarged Posterior Bilaterally] : posterior cervical [Cervical Lymph Nodes Enlarged Anterior Bilaterally] : anterior cervical, supraclavicular [No CVA Tenderness] : no CVA  tenderness [Normal Color / Pigmentation] : normal skin color and pigmentation [No Focal Deficits] : no focal deficits [Oriented To Time, Place, And Person] : oriented to person, place, and time [Normal Affect] : the affect was normal [Normal Insight/Judgment] : insight and judgment were intact [Normal Gait] : abnormal gait

## 2025-07-11 NOTE — REVIEW OF SYSTEMS
[Feeling Tired] : feeling tired [As Noted in HPI] : as noted in HPI [Dizziness] : dizziness [Difficulty Walking] : difficulty walking [Negative] : Heme/Lymph [de-identified] : dizziness is more like "wobbly feeling acc to pt, not dizzy, more imbalance feeling

## 2025-07-11 NOTE — HISTORY OF PRESENT ILLNESS
[Two or more falls in past year] : Patient reported two or more falls in the past year [Completely Independent] : Completely independent. [NO] : No [Little interest or pleasure doing things] : 1) Little interest or pleasure doing things [Feeling down, depressed, or hopeless] : 2) Feeling down, depressed, or hopeless [1] : 2) Feeling down, depressed, or hopeless for several days (1) [PHQ-2 Positive] : PHQ-2 Positive [I have developed a follow-up plan documented below in the note.] : I have developed a follow-up plan documented below in the note. [FreeTextEntry1] : Recieved call from home care nurse yesterday. Pt ahs fallen 3 times over past 2 weeks. Pt feels "wobbly" upon standing. pt denies fever, chills, cough, sob or palpitations. Pt alert and in good mood. Pt brought in by a . Using a cane, has a walker at home. Pt has a abrasion on right arm from fall , no other complaints. No weakness or facial droop noted. Speech intact.  BP meds were adjusted recently. Says valastratan at 80 mg makes her feel very very tired/fatigued  HR in past has been 56, today 73 [[id number]] : 2

## 2025-07-11 NOTE — PHYSICAL EXAM
[Alert] : alert [No Acute Distress] : in no acute distress [Normal] : the sclera and conjunctiva were normal, extraocular movements were intact, pupils were equal in size, round, and reactive to light [Normal Outer Ear/Nose] : the ears and nose were normal in appearance [Normal Appearance] : the appearance of the neck was normal [Supple] : the neck was supple [No Respiratory Distress] : no respiratory distress [No Acc Muscle Use] : no accessory muscle use [Respiration, Rhythm And Depth] : normal respiratory rhythm and effort [Auscultation Breath Sounds / Voice Sounds] : lungs were clear to auscultation bilaterally [Heart Rate And Rhythm] : heart rate was normal and rhythm regular [Bowel Sounds] : normal bowel sounds [Abdomen Tenderness] : non-tender [Abdomen Soft] : soft [No Spinal Tenderness] : no spinal tenderness [Normal Color / Pigmentation] : normal skin color and pigmentation [Normal Turgor] : normal skin turgor [No Focal Deficits] : no focal deficits [Normal Affect] : the affect was normal [de-identified] : good strength bilaterally and UE vs LE [de-identified] : no facial droop [de-identified] : sad and frightened

## 2025-07-11 NOTE — ASSESSMENT
[FreeTextEntry1] : valsartan 40 mg a day consider decreasing labetolol consider decreasing buspirone in future Return 2 weeks  Physical therapy   Ten minutes or more spent talking with pt about mood, sleep, appetite, motivation, anxiety in screening for depression.

## 2025-07-23 NOTE — PHYSICAL EXAM
[Normal Conjunctiva] : normal conjunctiva [Normal Venous Pressure] : normal venous pressure [Normal S1, S2] : normal S1, S2 [No Murmur] : no murmur [Clear Lung Fields] : clear lung fields [Good Air Entry] : good air entry [Soft] : abdomen soft [Normal Gait] : normal gait [No Edema] : no edema [No Rash] : no rash [Moves all extremities] : moves all extremities [Alert and Oriented] : alert and oriented [Normal memory] : normal memory [de-identified] : Thin woman in no acute distress.

## 2025-07-23 NOTE — DISCUSSION/SUMMARY
[FreeTextEntry1] : She is a 86-year-old with a history of labile hypertension, hypercholesterolemia and recent occipital stroke. HTN: much improved. Continue Labetalol 200 bid and valsartan 40 qd.  No change. Stroke: continue aspirin and lipid lowering therapy. Not compliant with repatha. Will see if she qualifies for leqvio. She will benefit from lower LDL. Terrible cramps and rash from statins in the past. Now off plavix. ECG: NSR, No acute changes. Encouraged safe walking with cane or walker. [EKG obtained to assist in diagnosis and management of assessed problem(s)] : EKG obtained to assist in diagnosis and management of assessed problem(s)

## 2025-07-23 NOTE — HISTORY OF PRESENT ILLNESS
[FreeTextEntry1] : Sertraline and buspar improving her mood. Hospitalized for PNA/UTI. Planning to have repeat CT scan in August. Does not like injecting herself and doesn't take repatha. (local irritation). Some falls. Loses balance and avoids using a cane...  Prior: Notes fatigue in the morning. She also reports feeling depressed particularly regarding living alone and some estrangement issues regarding her daughter and granddaughter. She is taking medications consistently and produces a blood pressure log that I reviewed which shows blood pressures ranging from the 1 teens to the 170s systolic. She is scheduled to go to [place] soon and is somewhat concerned about her early morning fatigue.  She does mention that she may be depressed.   Prior: new left leg radiculr pain. Head injury from an umbrella. Possible Concussion. Taken of plavix. Still taking baby aspirin...usually. subtle itching leg rash a few weeks ago.  Prior: She has been doing okay. BP at home is better. Some " normal low" BP readings. Feel like the medicine make her tired. Was given spinal cortisone injections and her thigh pain resolved. PT ongoing.  Prior: She was returned to the office today with her son and is concerned about 2 things 1 her blood pressure seems to be elevated since our last visit.  She also has been having bilateral upper groin pain that was treated by her pain medicine doctor with a right hip bursal injection.  There was improvement in her pain. She has been taking her valsartan and labetalol consistently. She does report considerably more stress in her life of late since her 's death and some friction with her son.  Prior: Deapetra Tian, Thank you for referring her for cardiovascular evaluation and management of her hypertension. She is an 84-year-old woman who was recently diagnosed with an occipital stroke and has been noted to have labile blood pressure in your office. Last week, after noticing an unstable gait she went to the hospital and was diagnosed with an occipital stroke she was started on aspirin and Plavix and her blood pressure continued to be difficult to measure.  In your office her blood pressure ranged from 167/82 to 124/70.  Her home blood pressure readings were variable as well. She has a history of hyperlipidemia and has been taking Repatha variably. Prior to Repatha she reports severe thigh muscle aches with statin use. She also has a history of anxiety and depression and been taking sertraline and BuSpar and duloxetine, though she has been alternating her dose of sertraline and BuSpar to every other day. Previously, her blood pressure has been difficult to control because of reactions to medications.  She reports some form of reaction to lisinopril including a rash on her stomach and leg.  She took amlodipine and may have had some troubles as well. Hydralazine caused her to become very lightheaded. With history of MGUS and is followed by Dr. Khan. Prior to this, she walks for exercise but does not perform any routine aerobic activity. Blood work done 4 days ago showed an LDL of 150 mg/dL and potassium of 4.6 with a creatinine of 1.17. She recently suffered the loss of her  who  this past November after a prolonged illness.

## 2025-07-30 NOTE — HISTORY OF PRESENT ILLNESS
[No falls in past year] : Patient reported no falls in the past year [NO] : No [Little interest or pleasure doing things] : 1) Little interest or pleasure doing things [Feeling down, depressed, or hopeless] : 2) Feeling down, depressed, or hopeless [0] : 2) Feeling down, depressed, or hopeless: Not at all (0) [PHQ-2 Positive] : PHQ-2 Positive [FreeTextEntry1] : 85 yo female here for follow-up for BP. Pt meds adjusted since last visit. Home care has been involved. Losartan and metoprolol adjusted. Pt has not fallen since last visit. Pt denies CP, SOB. Pt has had one episode at night where she felt lightheaded. Pt lowered valsartan 40 mg a day but takes it at night. Pt also on labetalol.  Pt tolerating decreased meds.  Pt concerned that blood pressure may be too low since having the episode at night. [[id number]] : 2

## 2025-07-30 NOTE — HISTORY OF PRESENT ILLNESS
[No falls in past year] : Patient reported no falls in the past year [NO] : No [Little interest or pleasure doing things] : 1) Little interest or pleasure doing things [Feeling down, depressed, or hopeless] : 2) Feeling down, depressed, or hopeless [0] : 2) Feeling down, depressed, or hopeless: Not at all (0) [PHQ-2 Positive] : PHQ-2 Positive [FreeTextEntry1] : 85 yo female here for follow-up for BP. Pt meds adjusted since last visit. Home care has been involved. Losartan and metoprolol adjusted. Pt has not fallen since last visit. Pt denies CP, SOB. Pt has had one episode at night where she felt lightheaded. Pt lowered valsartan 40 mg a day but takes it at night. Pt also on labetalol.  Pt tolerating decreased meds.  Pt concerned that blood pressure may be too low since having the episode at night. [[id number]] : 2